# Patient Record
Sex: MALE | Race: WHITE
[De-identification: names, ages, dates, MRNs, and addresses within clinical notes are randomized per-mention and may not be internally consistent; named-entity substitution may affect disease eponyms.]

---

## 2021-03-07 ENCOUNTER — HOSPITAL ENCOUNTER (EMERGENCY)
Dept: HOSPITAL 54 - ER | Age: 71
Discharge: HOME | End: 2021-03-07
Payer: MEDICARE

## 2021-03-07 VITALS — BODY MASS INDEX: 22.5 KG/M2 | WEIGHT: 140 LBS | HEIGHT: 66 IN

## 2021-03-07 VITALS — DIASTOLIC BLOOD PRESSURE: 89 MMHG | SYSTOLIC BLOOD PRESSURE: 149 MMHG

## 2021-03-07 DIAGNOSIS — Z98.890: ICD-10-CM

## 2021-03-07 DIAGNOSIS — I10: ICD-10-CM

## 2021-03-07 DIAGNOSIS — Z79.899: ICD-10-CM

## 2021-03-07 DIAGNOSIS — M54.10: Primary | ICD-10-CM

## 2021-03-07 PROCEDURE — 71111 X-RAY EXAM RIBS/CHEST4/> VWS: CPT

## 2021-03-07 PROCEDURE — 99283 EMERGENCY DEPT VISIT LOW MDM: CPT

## 2021-03-07 PROCEDURE — 96372 THER/PROPH/DIAG INJ SC/IM: CPT

## 2021-03-07 NOTE — NUR
BIB SON C/O BILATERAL LOWER RIB PAIN FOR 4 DAYS, TO ER BED 10, HOOKED TO 
MONITOR, CHANGED TO HOSP GOWN, WARM BLANKET PROVIDED. DR PEREYRA AT BEDSIDE